# Patient Record
Sex: FEMALE | Race: BLACK OR AFRICAN AMERICAN | Employment: OTHER | URBAN - METROPOLITAN AREA
[De-identification: names, ages, dates, MRNs, and addresses within clinical notes are randomized per-mention and may not be internally consistent; named-entity substitution may affect disease eponyms.]

---

## 2021-11-05 ENCOUNTER — OFFICE VISIT (OUTPATIENT)
Dept: ORTHOPEDIC SURGERY | Age: 81
End: 2021-11-05
Payer: MEDICARE

## 2021-11-05 DIAGNOSIS — M19.071 ARTHRITIS OF RIGHT ANKLE: Primary | ICD-10-CM

## 2021-11-05 PROCEDURE — 1090F PRES/ABSN URINE INCON ASSESS: CPT | Performed by: ORTHOPAEDIC SURGERY

## 2021-11-05 PROCEDURE — 1101F PT FALLS ASSESS-DOCD LE1/YR: CPT | Performed by: ORTHOPAEDIC SURGERY

## 2021-11-05 PROCEDURE — G8427 DOCREV CUR MEDS BY ELIG CLIN: HCPCS | Performed by: ORTHOPAEDIC SURGERY

## 2021-11-05 PROCEDURE — G8400 PT W/DXA NO RESULTS DOC: HCPCS | Performed by: ORTHOPAEDIC SURGERY

## 2021-11-05 PROCEDURE — 99203 OFFICE O/P NEW LOW 30 MIN: CPT | Performed by: ORTHOPAEDIC SURGERY

## 2021-11-05 PROCEDURE — G8536 NO DOC ELDER MAL SCRN: HCPCS | Performed by: ORTHOPAEDIC SURGERY

## 2021-11-05 PROCEDURE — G8421 BMI NOT CALCULATED: HCPCS | Performed by: ORTHOPAEDIC SURGERY

## 2021-11-05 PROCEDURE — G8510 SCR DEP NEG, NO PLAN REQD: HCPCS | Performed by: ORTHOPAEDIC SURGERY

## 2021-11-05 RX ORDER — CHOLECALCIFEROL (VITAMIN D3) 125 MCG
CAPSULE ORAL
COMMUNITY
Start: 2021-09-01 | End: 2022-02-27

## 2021-11-05 RX ORDER — OLMESARTAN MEDOXOMIL 5 MG/1
TABLET ORAL
COMMUNITY

## 2021-11-05 RX ORDER — DICLOFENAC SODIUM 10 MG/G
2 GEL TOPICAL 4 TIMES DAILY
Qty: 200 G | Refills: 2 | Status: SHIPPED | OUTPATIENT
Start: 2021-11-05 | End: 2021-11-05 | Stop reason: CLARIF

## 2021-11-05 RX ORDER — NIFEDIPINE 30 MG/1
TABLET, FILM COATED, EXTENDED RELEASE ORAL
COMMUNITY
Start: 2021-08-26

## 2021-11-05 RX ORDER — DICLOFENAC SODIUM 10 MG/G
2 GEL TOPICAL 4 TIMES DAILY
Qty: 200 G | Refills: 2 | Status: SHIPPED | OUTPATIENT
Start: 2021-11-05

## 2021-11-05 RX ORDER — MINERAL OIL
ENEMA (ML) RECTAL
COMMUNITY
Start: 2021-06-21 | End: 2021-12-17

## 2021-11-05 RX ORDER — LOSARTAN POTASSIUM 25 MG/1
TABLET ORAL
COMMUNITY

## 2021-11-05 NOTE — PROGRESS NOTES
Name: Randi Wills    : 1940     Service Dept: [facility]    Patient's Pharmacies:    [place] [facility] [address]  Phone: [phone] Fax: [phone]       Chief Complaint   Patient presents with    Foot Pain        There were no vitals taken for this visit. No Known Allergies   Current Outpatient Medications   Medication Sig Dispense Refill    cholecalciferol, vitamin D3, 50 mcg (2,000 unit) tab 1 tablet      NIFEdipine ER (ADALAT CC) 30 mg ER tablet 1 tablet on an empty stomach      fexofenadine (ALLEGRA) 180 mg tablet 1 tablet      diclofenac (VOLTAREN) 1 % gel Apply 2 g to affected area four (4) times daily. Apply 2 grams to affected area 4x a day 200 g 2    losartan (COZAAR) 25 mg tablet 1 tablet      olmesartan (BENICAR) 5 mg tablet 1 tablet        There is no problem list on file for this patient. Family History   Problem Relation Age of Onset    No Known Problems Mother     No Known Problems Father       Social History     Socioeconomic History    Marital status:    Tobacco Use    Smoking status: Never Smoker    Smokeless tobacco: Never Used   Substance and Sexual Activity    Alcohol use: Never      Past Surgical History:   Procedure Laterality Date    CA ANESTH,SURGERY OF SHOULDER        Past Medical History:   Diagnosis Date    Hypertension         I have reviewed and agree with [address] [place] [address] Nw and ROS and intake form in chart and the record furthermore I have reviewed prior medical record(s) regarding this patients care during this appointment. Review of Systems:   Patient is a pleasant appearing individual, appropriately dressed, well hydrated, well nourished, who is alert, appropriately oriented for age, and in no acute distress with a normal gait and normal affect who does not appear to be in any significant pain.    Physical Exam:  Right Ankle- Grossly neurovascularly intact with good cap refill, decreased range of motion, decreased strength, positive crepitation, minimal swelling, skin intact with no skin lesions, no erythema, no echymosis, no point tenderness. Left Ankle - No point tenderness, Full range of motion, No instability, No Weakness, No, skin lesions, No swelling, No instability, Grossly neurovascularly intact. Encounter Diagnoses     ICD-10-CM ICD-9-CM   1. Arthritis of right ankle  M19.071 716.97       HPI:  The patient is here with a chief complaint of right foot pain, throbbing, burning pain, progressively getting worse. Pain is 8/10. X-rays are positive for OA in nature. Assessment/Plan:  Plan will be for Voltaren gel, activities as tolerated, weightbearing started, no restrictions. We will see the patient back as needed and go from there. As part of continued conservative pain management options the patient was advised to utilize Tylenol or OTC NSAIDS as long as it is not medically contraindicated. Return to Office: Follow-up and Dispositions    · Return if symptoms worsen or fail to improve. Scribed by Maria T Valenzuela LPN as dictated by [facility] Alex MD.  Documentation True and Accepted Ac Alex MD

## 2021-11-05 NOTE — LETTER
11/8/2021    Patient: Ai Issa   YOB: 1940   Date of Visit: 11/5/2021     Kalpana Britt MD  [address] [place] [id number]  Via Fax: [phone]    Dear Kalpana Britt MD,      Thank you for referring Ms. Ai Issa to [address] [place] [address] AND [facility] for evaluation. My notes for this consultation are attached. If you have questions, please do not hesitate to call me. I look forward to following your patient along with you.       Sincerely,    Cory Ramsey MD

## 2021-11-05 NOTE — PATIENT INSTRUCTIONS
Ankle: Exercises  Introduction  Here are some examples of exercises for you to try. The exercises may be suggested for a condition or for rehabilitation. Start each exercise slowly. Ease off the exercises if you start to have pain. You will be told when to start these exercises and which ones will work best for you. How to do the exercises  'Alphabet' exercise    1. Trace the alphabet with your toe. This helps your ankle move in all directions. Side-to-side knee swing exercise    1. Sit in a chair with your foot flat on the floor. 2. Slowly move your knee from side to side while keeping your foot pressed flat. 3. Continue this exercise for 2 to 3 minutes. Towel curl    1. While sitting, place your foot on a towel on the floor and scrunch the towel toward you with your toes. 2. Then use your toes to push the towel away from you. 3. Make this exercise more challenging by placing a weighted object, such as a soup can, on the other end of the towel. Towel stretch    1. Sit with your legs extended and knees straight. 2. Place a towel around your foot just under the toes. 3. Hold each end of the towel in each hand, with your hands above your knees. 4. Pull back with the towel so that your foot stretches toward you. 5. Hold the position for at least 15 to 30 seconds. 6. Repeat 2 to 4 times a session, up to 5 sessions a day. Ankle eversion exercise    1. Start by sitting with your foot flat on the floor and pushing it outward against an immovable object such as the wall or heavy furniture. Hold for about 6 seconds, then relax. Repeat 8 to 12 times. 2. After you feel comfortable with this, try using rubber tubing looped around the outside of your feet for resistance. Push your foot out to the side against the tubing, and then count to 10 as you slowly bring your foot back to the middle. Repeat 8 to 12 times. Isometric opposition exercises    1.  While sitting, put your feet together flat on the floor.  2. Press your injured foot inward against your other foot. Hold for about 6 seconds, and relax. Repeat 8 to 12 times. 3. Then place the heel of your other foot on top of the injured one. Push down with the top heel while trying to push up with your injured foot. Hold for about 6 seconds, and relax. Repeat 8 to 12 times. Follow-up care is a key part of your treatment and safety. Be sure to make and go to all appointments, and call your doctor if you are having problems. It's also a good idea to know your test results and keep a list of the medicines you take. Where can you learn more? Go to http://www.gray.com/  Enter R730 in the search box to learn more about \"Ankle: Exercises. \"  Current as of: July 1, 2021               Content Version: 13.0  © 4485-2058 [employer]. Care instructions adapted under license by [employer] (which disclaims liability or warranty for this information). If you have questions about a medical condition or this instruction, always ask your healthcare professional. [address] any warranty or liability for your use of this information.